# Patient Record
Sex: MALE | Race: WHITE | Employment: FULL TIME
[De-identification: names, ages, dates, MRNs, and addresses within clinical notes are randomized per-mention and may not be internally consistent; named-entity substitution may affect disease eponyms.]

---

## 2024-01-31 ENCOUNTER — OFFICE VISIT (OUTPATIENT)
Facility: CLINIC | Age: 64
End: 2024-01-31

## 2024-01-31 ENCOUNTER — HOSPITAL ENCOUNTER (OUTPATIENT)
Facility: HOSPITAL | Age: 64
Setting detail: SPECIMEN
Discharge: HOME | End: 2024-02-03
Payer: COMMERCIAL

## 2024-01-31 VITALS
TEMPERATURE: 97.6 F | BODY MASS INDEX: 27.66 KG/M2 | OXYGEN SATURATION: 95 % | WEIGHT: 204.2 LBS | SYSTOLIC BLOOD PRESSURE: 135 MMHG | DIASTOLIC BLOOD PRESSURE: 75 MMHG | HEART RATE: 70 BPM | RESPIRATION RATE: 20 BRPM | HEIGHT: 72 IN

## 2024-01-31 DIAGNOSIS — Z13.1 DIABETES MELLITUS SCREENING: ICD-10-CM

## 2024-01-31 DIAGNOSIS — I25.10 CORONARY ARTERY DISEASE INVOLVING NATIVE CORONARY ARTERY OF NATIVE HEART WITHOUT ANGINA PECTORIS: ICD-10-CM

## 2024-01-31 DIAGNOSIS — Z98.1 S/P CERVICAL SPINAL FUSION: ICD-10-CM

## 2024-01-31 DIAGNOSIS — M17.31 POST-TRAUMATIC OSTEOARTHRITIS OF RIGHT KNEE: Primary | ICD-10-CM

## 2024-01-31 LAB
EST. AVERAGE GLUCOSE BLD GHB EST-MCNC: 120 MG/DL
HBA1C MFR BLD: 5.8 % (ref 4.2–5.6)

## 2024-01-31 PROCEDURE — 36415 COLL VENOUS BLD VENIPUNCTURE: CPT

## 2024-01-31 PROCEDURE — 83036 HEMOGLOBIN GLYCOSYLATED A1C: CPT

## 2024-01-31 RX ORDER — PREDNISONE 10 MG/1
TABLET ORAL
Qty: 63 TABLET | Refills: 0 | Status: SHIPPED | OUTPATIENT
Start: 2024-01-31

## 2024-01-31 SDOH — ECONOMIC STABILITY: HOUSING INSECURITY
IN THE LAST 12 MONTHS, WAS THERE A TIME WHEN YOU DID NOT HAVE A STEADY PLACE TO SLEEP OR SLEPT IN A SHELTER (INCLUDING NOW)?: NO

## 2024-01-31 SDOH — ECONOMIC STABILITY: INCOME INSECURITY: HOW HARD IS IT FOR YOU TO PAY FOR THE VERY BASICS LIKE FOOD, HOUSING, MEDICAL CARE, AND HEATING?: NOT VERY HARD

## 2024-01-31 SDOH — ECONOMIC STABILITY: FOOD INSECURITY: WITHIN THE PAST 12 MONTHS, THE FOOD YOU BOUGHT JUST DIDN'T LAST AND YOU DIDN'T HAVE MONEY TO GET MORE.: NEVER TRUE

## 2024-01-31 SDOH — ECONOMIC STABILITY: FOOD INSECURITY: WITHIN THE PAST 12 MONTHS, YOU WORRIED THAT YOUR FOOD WOULD RUN OUT BEFORE YOU GOT MONEY TO BUY MORE.: NEVER TRUE

## 2024-01-31 ASSESSMENT — PATIENT HEALTH QUESTIONNAIRE - PHQ9
SUM OF ALL RESPONSES TO PHQ QUESTIONS 1-9: 0
1. LITTLE INTEREST OR PLEASURE IN DOING THINGS: 0
SUM OF ALL RESPONSES TO PHQ9 QUESTIONS 1 & 2: 0
SUM OF ALL RESPONSES TO PHQ QUESTIONS 1-9: 0
2. FEELING DOWN, DEPRESSED OR HOPELESS: 0

## 2024-01-31 NOTE — PROGRESS NOTES
Jonas Eduardo is a 64 y.o. presents today for   Chief Complaint   Patient presents with    Annual Exam       Is someone accompanying this pt? NO    Is the patient using any DME equipment during OV? NO    Depression Screenin/31/2024     2:04 PM 2022     9:30 AM   PHQ-9 Questionaire   Little interest or pleasure in doing things 0 0   Feeling down, depressed, or hopeless 0 0   PHQ-9 Total Score 0 0       Abuse Screening:      10/26/2023     8:00 AM   AMB Abuse Screening   Do you ever feel afraid of your partner? N   Are you in a relationship with someone who physically or mentally threatens you? N   Is it safe for you to go home? Y       Learning Assessment:  No question data found.    Fall Risk:      10/26/2023     8:09 AM   Fall Risk   2 or more falls in past year? no   Fall with injury in past year? no           Coordination of Care:   1. \"Have you been to the ER, urgent care clinic since your last visit?  Hospitalized since your last visit?\" NO     2. \"Have you seen or consulted any other health care providers outside of the [facility] since your last visit?\" [facility]    3. For patients aged 45-75: Has the patient had a colonoscopy / FIT/ Cologuard? YES    If the patient is female:    4. For patients aged 40-74: Has the patient had a mammogram within the past 2 years? N/A    5. For patients aged 21-65: Has the patient had a pap smear? N/A    Health Maintenance: reviewed and discussed and ordered per Provider.    Health Maintenance Due   Topic Date Due    COVID-19 Vaccine (1) Never done    HIV screen  Never done    DTaP/Tdap/Td vaccine (1 - Tdap) Never done    Diabetes screen  Never done    Shingles vaccine (1 of 2) Never done    Respiratory Syncytial Virus (RSV) Pregnant or age 60 yrs+ (1 - 1-dose 60+ series) Never done    Depression Screen  2023    Flu vaccine (1) 2023        Lilian Martell LPN  [facility]
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
## 2024-02-02 ENCOUNTER — TELEPHONE (OUTPATIENT)
Facility: CLINIC | Age: 64
End: 2024-02-02

## 2024-02-16 RX ORDER — LOSARTAN POTASSIUM 25 MG/1
25 TABLET ORAL 2 TIMES DAILY
Qty: 180 TABLET | Refills: 2 | Status: SHIPPED | OUTPATIENT
Start: 2024-02-16

## 2024-02-16 NOTE — TELEPHONE ENCOUNTER
PCP: David Carranza MD    Last appt:  10/26/2023   Future Appointments   Date Time Provider Department Center   2/29/2024  8:45 AM David Carranza MD [facility] BS AMB   10/31/2024  8:15 AM Kris Morales MD [facility] BS AMB       Requested Prescriptions     Pending Prescriptions Disp Refills    losartan (COZAAR) 25 MG tablet 180 tablet 2     Sig: Take 1 tablet by mouth 2 times daily       Request for a 30 or 90 day supply? Provider Discretion    Pharmacy: confirmed    Other Comments: n/a

## 2024-02-29 ENCOUNTER — OFFICE VISIT (OUTPATIENT)
Facility: CLINIC | Age: 64
End: 2024-02-29
Payer: COMMERCIAL

## 2024-02-29 VITALS
HEIGHT: 72 IN | TEMPERATURE: 97 F | WEIGHT: 204.6 LBS | DIASTOLIC BLOOD PRESSURE: 83 MMHG | BODY MASS INDEX: 27.71 KG/M2 | SYSTOLIC BLOOD PRESSURE: 133 MMHG | RESPIRATION RATE: 20 BRPM | HEART RATE: 65 BPM | OXYGEN SATURATION: 96 %

## 2024-02-29 DIAGNOSIS — I25.10 CORONARY ARTERY DISEASE INVOLVING NATIVE CORONARY ARTERY OF NATIVE HEART WITHOUT ANGINA PECTORIS: ICD-10-CM

## 2024-02-29 DIAGNOSIS — M17.31 POST-TRAUMATIC OSTEOARTHRITIS OF RIGHT KNEE: ICD-10-CM

## 2024-02-29 DIAGNOSIS — Z98.1 S/P CERVICAL SPINAL FUSION: ICD-10-CM

## 2024-02-29 DIAGNOSIS — R73.01 IMPAIRED FASTING GLUCOSE: ICD-10-CM

## 2024-02-29 DIAGNOSIS — J06.9 URI WITH COUGH AND CONGESTION: Primary | ICD-10-CM

## 2024-02-29 PROCEDURE — 3075F SYST BP GE 130 - 139MM HG: CPT | Performed by: FAMILY MEDICINE

## 2024-02-29 PROCEDURE — 99213 OFFICE O/P EST LOW 20 MIN: CPT | Performed by: FAMILY MEDICINE

## 2024-02-29 PROCEDURE — 3079F DIAST BP 80-89 MM HG: CPT | Performed by: FAMILY MEDICINE

## 2024-02-29 RX ORDER — PREDNISONE 10 MG/1
TABLET ORAL
Qty: 60 TABLET | Refills: 0 | Status: SHIPPED | OUTPATIENT
Start: 2024-02-29

## 2024-02-29 NOTE — PROGRESS NOTES
Jonas Eduardo is a 64 y.o.  male and presents with 1 mo follow up on joint pain.        Subjective:  Patient reports good compliance with prednisone. Has noticed significant improvement. He has an ortho apt next week for the neck. He was unable to schedule a rheum apt because did not have their number.            Additional Concerns: 4 day history of URI sxs. He noticed a \"scratchy\" cough on Mon. Now he has head fullness. He has controlled the sxs with Mucinex and Tylenol with good relief. Denies shortness of breath, chest pain, N/V, and fevers.             ROS   General ROS: negative for - chills or fever  ENT ROS: positive for - headaches, nasal congestion, sinus pain, and sore throat  Respiratory ROS: positive for - cough negative for - shortness of breath  Cardiovascular ROS: no chest pain or dyspnea on exertion  Musculoskeletal ROS: positive for - joint pain  Neurological ROS: positive for - headaches    All other systems reviewed and are negative.      Objective:    alert, well appearing, and in no distress, oriented to person, place, and time, and overweight  Mental status - normal mood, behavior, speech, dress, motor activity, and thought processes  Chest - clear to auscultation, no wheezes, rales or rhonchi, symmetric air entry  Heart - normal rate, regular rhythm, normal S1, S2, no murmurs, rubs, clicks or gallops  Right knee - Arthritic changes noted  Neck - Hypertonic; no tenderness to palpation.      Assessment/Plan:    1. Post-traumatic osteoarthritis of right knee  Refer for further evaluation and prednisone until seen  - External Referral To Rheumatology (given number to call)  - predniSONE (DELTASONE) 10 MG tablet  2. Coronary artery disease involving native coronary artery of native heart without angina pectoris  Follow up with cardiologist    3. S/P cervical spinal fusion    - predniSONE (DELTASONE) 10 MG tablet; Take 6 tabs for 3 days then 5 tabs for 3 days then 4 tabs for 3 days then 3 tabs

## 2024-02-29 NOTE — PROGRESS NOTES
Jonas Eduardo is a 64 y.o. presents today for   Chief Complaint   Patient presents with    Cough    Congestion    Headache       Is someone accompanying this pt? NO    Is the patient using any DME equipment during OV? NO    Depression Screenin/31/2024     2:04 PM 2022     9:30 AM   PHQ-9 Questionaire   Little interest or pleasure in doing things 0 0   Feeling down, depressed, or hopeless 0 0   PHQ-9 Total Score 0 0       Abuse Screening:      10/26/2023     8:00 AM   AMB Abuse Screening   Do you ever feel afraid of your partner? N   Are you in a relationship with someone who physically or mentally threatens you? N   Is it safe for you to go home? Y       Learning Assessment:  No question data found.    Fall Risk:      10/26/2023     8:09 AM   Fall Risk   2 or more falls in past year? no   Fall with injury in past year? no           Coordination of Care:   1. \"Have you been to the ER, urgent care clinic since your last visit?  Hospitalized since your last visit?\" NO    2. \"Have you seen or consulted any other health care providers outside of the [facility] since your last visit?\" NO    3. For patients aged 45-75: Has the patient had a colonoscopy / FIT/ Cologuard? YES    If the patient is female:    4. For patients aged 40-74: Has the patient had a mammogram within the past 2 years? N/A    5. For patients aged 21-65: Has the patient had a pap smear? N/A    Health Maintenance: reviewed and discussed and ordered per Provider.    Health Maintenance Due   Topic Date Due    COVID-19 Vaccine (1) Never done    HIV screen  Never done    DTaP/Tdap/Td vaccine (1 - Tdap) Never done    Shingles vaccine (1 of 2) Never done    Respiratory Syncytial Virus (RSV) Pregnant or age 60 yrs+ (1 - 1-dose 60+ series) Never done    Flu vaccine (1) 2023        Lilian Martell LPN  [facility]  Phone: [phone]  Fax: [phone]

## 2024-05-09 RX ORDER — CARVEDILOL 6.25 MG/1
6.25 TABLET ORAL 2 TIMES DAILY WITH MEALS
Qty: 90 TABLET | Refills: 4 | Status: CANCELLED | OUTPATIENT
Start: 2024-05-09

## 2024-05-09 NOTE — TELEPHONE ENCOUNTER
PCP: David Carranza MD    Last appt:  10/26/2023   Future Appointments   Date Time Provider Department Center   8/29/2024  8:45 AM David Carranza MD [facility] BS AMB   10/31/2024  8:15 AM Kris Morales MD [facility] BS AMB       Requested Prescriptions     Pending Prescriptions Disp Refills    isosorbide mononitrate (IMDUR) 30 MG extended release tablet 90 tablet 2     Sig: Take 1 tablet by mouth daily       Request for a 30 or 90 day supply? Provider Discretion    Pharmacy: confirm    Other Comments: n/a

## 2024-05-09 NOTE — TELEPHONE ENCOUNTER
PCP: David Carranza MD    Last appt:  10/26/2023   Future Appointments   Date Time Provider Department Center   8/29/2024  8:45 AM David Carranza MD [facility] BS AMB   10/31/2024  8:15 AM Kris Morales MD [facility] BS AMB       Requested Prescriptions     Pending Prescriptions Disp Refills    isosorbide mononitrate (IMDUR) 30 MG extended release tablet 90 tablet 2     Sig: Take 1 tablet by mouth daily    carvedilol (COREG) 6.25 MG tablet 90 tablet 4     Sig: Take 1 tablet by mouth with breakfast and with evening meal       Request for a 30 or 90 day supply? Provider Discretion    Pharmacy: confirm    Other Comments: n/a

## 2024-05-10 RX ORDER — ISOSORBIDE MONONITRATE 30 MG/1
30 TABLET, EXTENDED RELEASE ORAL DAILY
Qty: 90 TABLET | Refills: 1 | Status: SHIPPED | OUTPATIENT
Start: 2024-05-10

## 2024-05-10 RX ORDER — CARVEDILOL 6.25 MG/1
6.25 TABLET ORAL 2 TIMES DAILY WITH MEALS
Qty: 90 TABLET | Refills: 1 | Status: SHIPPED | OUTPATIENT
Start: 2024-05-10

## 2024-05-16 RX ORDER — ISOSORBIDE MONONITRATE 30 MG/1
30 TABLET, EXTENDED RELEASE ORAL DAILY
Qty: 90 TABLET | Refills: 3 | Status: SHIPPED | OUTPATIENT
Start: 2024-05-16

## 2024-05-16 NOTE — TELEPHONE ENCOUNTER
PCP: David Carranza MD    Last appt:  10/26/2023   Future Appointments   Date Time Provider Department Center   8/29/2024  8:45 AM David Carranza MD [facility] BS AMB   10/31/2024  8:15 AM Kris Morales MD [facility] BS AMB       Requested Prescriptions     Pending Prescriptions Disp Refills    isosorbide mononitrate (IMDUR) 30 MG extended release tablet 90 tablet 1     Sig: Take 1 tablet by mouth daily       Request for a 30 or 90 day supply? Provider Discretion    Pharmacy: confirm    Other Comments: n/a

## 2024-06-24 DIAGNOSIS — Z98.1 S/P CERVICAL SPINAL FUSION: ICD-10-CM

## 2024-06-24 DIAGNOSIS — M17.31 POST-TRAUMATIC OSTEOARTHRITIS OF RIGHT KNEE: ICD-10-CM

## 2024-06-26 RX ORDER — PREDNISONE 10 MG/1
TABLET ORAL
Qty: 60 TABLET | Refills: 0 | Status: SHIPPED | OUTPATIENT
Start: 2024-06-26

## 2024-07-09 RX ORDER — ATORVASTATIN CALCIUM 40 MG/1
40 TABLET, FILM COATED ORAL DAILY
Qty: 90 TABLET | Refills: 2 | Status: SHIPPED | OUTPATIENT
Start: 2024-07-09

## 2024-07-09 NOTE — TELEPHONE ENCOUNTER
PCP: David Carranza MD    Last appt:  10/26/2023   Future Appointments   Date Time Provider Department Center   8/29/2024  8:45 AM David Carranza MD [facility] BS AMB   10/31/2024  8:15 AM Kris Morales MD [facility] BS AMB       Requested Prescriptions     Pending Prescriptions Disp Refills    atorvastatin (LIPITOR) 40 MG tablet 90 tablet 2     Sig: Take 1 tablet by mouth daily       Request for a 30 or 90 day supply? Provider Discretion    Pharmacy: confirmed    Other Comments:n/a

## 2024-08-15 NOTE — TELEPHONE ENCOUNTER
PCP: David Carranza MD    Last appt:  10/26/2023   Future Appointments   Date Time Provider Department Center   8/29/2024  8:45 AM David Carranza MD [facility]   10/31/2024  8:15 AM rKis Morales MD [facility]       Requested Prescriptions     Pending Prescriptions Disp Refills    carvedilol (COREG) 6.25 MG tablet 90 tablet 1     Sig: Take 1 tablet by mouth with breakfast and with evening meal       Request for a 30 or 90 day supply? Provider Discretion    Pharmacy: confirmed     Other Comments: n/a

## 2024-08-16 RX ORDER — CARVEDILOL 6.25 MG/1
6.25 TABLET ORAL 2 TIMES DAILY WITH MEALS
Qty: 90 TABLET | Refills: 1 | Status: SHIPPED | OUTPATIENT
Start: 2024-08-16

## 2024-08-29 ENCOUNTER — OFFICE VISIT (OUTPATIENT)
Facility: CLINIC | Age: 64
End: 2024-08-29
Payer: COMMERCIAL

## 2024-08-29 ENCOUNTER — HOSPITAL ENCOUNTER (OUTPATIENT)
Facility: HOSPITAL | Age: 64
Setting detail: SPECIMEN
Discharge: HOME | End: 2024-08-29
Payer: COMMERCIAL

## 2024-08-29 VITALS
HEIGHT: 72 IN | SYSTOLIC BLOOD PRESSURE: 117 MMHG | DIASTOLIC BLOOD PRESSURE: 73 MMHG | HEART RATE: 54 BPM | TEMPERATURE: 97.6 F | BODY MASS INDEX: 27.66 KG/M2 | RESPIRATION RATE: 18 BRPM | WEIGHT: 204.2 LBS | OXYGEN SATURATION: 98 %

## 2024-08-29 DIAGNOSIS — M79.2 RADICULAR PAIN IN RIGHT ARM: Primary | ICD-10-CM

## 2024-08-29 DIAGNOSIS — M17.31 POST-TRAUMATIC OSTEOARTHRITIS OF RIGHT KNEE: ICD-10-CM

## 2024-08-29 DIAGNOSIS — I25.10 CORONARY ARTERY DISEASE INVOLVING NATIVE CORONARY ARTERY OF NATIVE HEART WITHOUT ANGINA PECTORIS: ICD-10-CM

## 2024-08-29 DIAGNOSIS — R73.01 IMPAIRED FASTING GLUCOSE: ICD-10-CM

## 2024-08-29 DIAGNOSIS — E78.00 HYPERCHOLESTEREMIA: ICD-10-CM

## 2024-08-29 DIAGNOSIS — Z98.1 S/P CERVICAL SPINAL FUSION: ICD-10-CM

## 2024-08-29 DIAGNOSIS — Z28.21 REFUSED INFLUENZA VACCINE: ICD-10-CM

## 2024-08-29 DIAGNOSIS — I10 PRIMARY HYPERTENSION: ICD-10-CM

## 2024-08-29 LAB
ALBUMIN SERPL-MCNC: 3.3 G/DL (ref 3.4–5)
ALBUMIN/GLOB SERPL: 0.8 (ref 0.8–1.7)
ALP SERPL-CCNC: 191 U/L (ref 45–117)
ALT SERPL-CCNC: 42 U/L (ref 16–61)
ANION GAP SERPL CALC-SCNC: 8 MMOL/L (ref 3–18)
AST SERPL-CCNC: 26 U/L (ref 10–38)
BILIRUB SERPL-MCNC: 0.6 MG/DL (ref 0.2–1)
BUN SERPL-MCNC: 11 MG/DL (ref 7–18)
BUN/CREAT SERPL: 15 (ref 12–20)
CALCIUM SERPL-MCNC: 9.5 MG/DL (ref 8.5–10.1)
CHLORIDE SERPL-SCNC: 106 MMOL/L (ref 100–111)
CHOLEST SERPL-MCNC: 137 MG/DL
CO2 SERPL-SCNC: 26 MMOL/L (ref 21–32)
CREAT SERPL-MCNC: 0.74 MG/DL (ref 0.6–1.3)
EST. AVERAGE GLUCOSE BLD GHB EST-MCNC: 128 MG/DL
GLOBULIN SER CALC-MCNC: 4.1 G/DL (ref 2–4)
GLUCOSE SERPL-MCNC: 125 MG/DL (ref 74–99)
HBA1C MFR BLD: 6.1 % (ref 4.2–5.6)
HDLC SERPL-MCNC: 47 MG/DL (ref 40–60)
HDLC SERPL: 2.9 (ref 0–5)
LDLC SERPL CALC-MCNC: 73.4 MG/DL (ref 0–100)
LIPID PANEL: NORMAL
POTASSIUM SERPL-SCNC: 4.3 MMOL/L (ref 3.5–5.5)
PROT SERPL-MCNC: 7.4 G/DL (ref 6.4–8.2)
SODIUM SERPL-SCNC: 140 MMOL/L (ref 136–145)
TRIGL SERPL-MCNC: 83 MG/DL
VLDLC SERPL CALC-MCNC: 16.6 MG/DL

## 2024-08-29 PROCEDURE — 83036 HEMOGLOBIN GLYCOSYLATED A1C: CPT

## 2024-08-29 PROCEDURE — 80061 LIPID PANEL: CPT

## 2024-08-29 PROCEDURE — 99214 OFFICE O/P EST MOD 30 MIN: CPT | Performed by: FAMILY MEDICINE

## 2024-08-29 PROCEDURE — 3074F SYST BP LT 130 MM HG: CPT | Performed by: FAMILY MEDICINE

## 2024-08-29 PROCEDURE — 80053 COMPREHEN METABOLIC PANEL: CPT

## 2024-08-29 PROCEDURE — 3078F DIAST BP <80 MM HG: CPT | Performed by: FAMILY MEDICINE

## 2024-08-29 PROCEDURE — 36415 COLL VENOUS BLD VENIPUNCTURE: CPT

## 2024-08-29 RX ORDER — PREDNISONE 10 MG/1
TABLET ORAL
Qty: 60 TABLET | Refills: 0 | Status: SHIPPED | OUTPATIENT
Start: 2024-08-29

## 2024-08-29 RX ORDER — GABAPENTIN 300 MG/1
300 CAPSULE ORAL 3 TIMES DAILY
COMMUNITY
Start: 2024-08-16

## 2024-08-29 SDOH — ECONOMIC STABILITY: FOOD INSECURITY: WITHIN THE PAST 12 MONTHS, THE FOOD YOU BOUGHT JUST DIDN'T LAST AND YOU DIDN'T HAVE MONEY TO GET MORE.: NEVER TRUE

## 2024-08-29 SDOH — ECONOMIC STABILITY: FOOD INSECURITY: WITHIN THE PAST 12 MONTHS, YOU WORRIED THAT YOUR FOOD WOULD RUN OUT BEFORE YOU GOT MONEY TO BUY MORE.: NEVER TRUE

## 2024-08-29 SDOH — ECONOMIC STABILITY: INCOME INSECURITY: HOW HARD IS IT FOR YOU TO PAY FOR THE VERY BASICS LIKE FOOD, HOUSING, MEDICAL CARE, AND HEATING?: NOT VERY HARD

## 2024-08-29 ASSESSMENT — PATIENT HEALTH QUESTIONNAIRE - PHQ9
SUM OF ALL RESPONSES TO PHQ QUESTIONS 1-9: 0
SUM OF ALL RESPONSES TO PHQ QUESTIONS 1-9: 0
1. LITTLE INTEREST OR PLEASURE IN DOING THINGS: NOT AT ALL
SUM OF ALL RESPONSES TO PHQ9 QUESTIONS 1 & 2: 0
2. FEELING DOWN, DEPRESSED OR HOPELESS: NOT AT ALL
SUM OF ALL RESPONSES TO PHQ QUESTIONS 1-9: 0
SUM OF ALL RESPONSES TO PHQ QUESTIONS 1-9: 0

## 2024-08-29 NOTE — PROGRESS NOTES
Jonas Eduardo is a 64 y.o.  male and presents with    Chief Complaint   Patient presents with    Medication Refill           Subjective:  Mr. Eduardo is following up for right side pinched nerve; he has had corticosteroid in cervical spine 2 months ago for the left side with minimal relief.  He does not drop items; he has weakness in arms and shoulders; he takes prednisone intermittently with good results averaging once a weak.  He is s/p physical therapy for 5 weeks with some improvement.      He has hypertension and hypercholesterolemia with h/o CAD and NSTEMI; he is followed by cardiology for management.      ROS   General ROS: negative for - chills or fever  ENT ROS: positive for - headaches, nasal congestion, sinus pain, and sore throat  Respiratory ROS: positive for - cough negative for - shortness of breath  Cardiovascular ROS: no chest pain or dyspnea on exertion  Musculoskeletal ROS: positive for - joint pain  Neurological ROS: positive for - headaches    All other systems reviewed and are negative.      Objective:  Vitals:    08/29/24 0914   BP: 117/73   Pulse: 54   Resp: 18   Temp: 97.6 °F (36.4 °C)   SpO2: 98%       alert, well appearing, and in no distress, oriented to person, place, and time, and overweight  Mental status - normal mood, behavior, speech, dress, motor activity, and thought processes  Chest - clear to auscultation, no wheezes, rales or rhonchi, symmetric air entry  Heart - normal rate, regular rhythm, normal S1, S2, no murmurs, rubs, clicks or gallops  Right knee - Arthritic changes noted  Neck - Hypertonic; no tenderness to palpation.    LABS     TESTS      Assessment/Plan:    1. Radicular pain in right arm  Follow up with ortho for cervical ddd and BLANK    2. Post-traumatic osteoarthritis of right knee  Exercises demonstrated    3. Impaired fasting glucose  Encourage low carb diet  - Hemoglobin A1C; Future    4. Coronary artery disease involving native coronary artery of native heart

## 2024-08-29 NOTE — PROGRESS NOTES
Jonas Eduardo is a 64 y.o. presents today for No chief complaint on file.    Is someone accompanying this pt? no    Is the patient using any DME equipment during OV? no  There were no vitals filed for this visit.    Depression Screenin/29/2024     9:06 AM 2024     2:04 PM 2022     9:30 AM   PHQ-9 Questionaire   Little interest or pleasure in doing things 0 0 0   Feeling down, depressed, or hopeless 0 0 0   PHQ-9 Total Score 0 0 0        Abuse Screening:      10/26/2023     8:00 AM   AMB Abuse Screening   Do you ever feel afraid of your partner? N   Are you in a relationship with someone who physically or mentally threatens you? N   Is it safe for you to go home? Y        Learning Assessment Screening:   No question data found.     Fall Risk Screening:       10/26/2023     8:09 AM   Fall Risk   Do you feel unsteady or are you worried about falling?  no   2 or more falls in past year? no   Fall with injury in past year? no           Health Maintenance: reviewed and discussed and ordered per Provider.    Health Maintenance Due   Topic Date Due    HIV screen  Never done    DTaP/Tdap/Td vaccine (1 - Tdap) Never done    Shingles vaccine (1 of 2) Never done    Respiratory Syncytial Virus (RSV) Pregnant or age 60 yrs+ (1 - 1-dose 60+ series) Never done    COVID-19 Vaccine (1 - - season) Never done    Flu vaccine (1) 2024         Coordination of Care:   1. \"Have you been to the ER, urgent care clinic since your last visit?  Hospitalized since your last visit?\" no    2. \"Have you seen or consulted any other health care providers outside of the [facility] since your last visit?\" no    3. For patients aged 45-75: Has the patient had a colonoscopy / FIT/ Cologuard?Yes - no Care Gap present  If the patient is female:    4. For patients aged 40-74: Has the patient had a mammogram within the past 2 years? NA - based on age or sex    5. For patients aged 21-65: Has the patient had a pap  smear? NA - based on age or sex    Advanced Directive:  1. Do you have an Advanced Directive? No     2. Would you like information on Advanced Directives? No

## 2024-09-12 DIAGNOSIS — R74.8 ELEVATED ALKALINE PHOSPHATASE LEVEL: Primary | ICD-10-CM

## 2024-10-08 ENCOUNTER — HOSPITAL ENCOUNTER (OUTPATIENT)
Facility: HOSPITAL | Age: 64
Setting detail: SPECIMEN
Discharge: HOME | End: 2024-10-11
Payer: COMMERCIAL

## 2024-10-08 DIAGNOSIS — R74.8 ELEVATED ALKALINE PHOSPHATASE LEVEL: ICD-10-CM

## 2024-10-08 PROCEDURE — 36415 COLL VENOUS BLD VENIPUNCTURE: CPT

## 2024-10-08 PROCEDURE — 82977 ASSAY OF GGT: CPT

## 2024-10-09 LAB — GGT SERPL-CCNC: 218 U/L (ref 15–85)

## 2024-10-16 DIAGNOSIS — R74.8 ELEVATED SERUM GGT LEVEL: Primary | ICD-10-CM

## 2024-10-31 ENCOUNTER — OFFICE VISIT (OUTPATIENT)
Age: 64
End: 2024-10-31
Payer: COMMERCIAL

## 2024-10-31 ENCOUNTER — HOSPITAL ENCOUNTER (OUTPATIENT)
Facility: HOSPITAL | Age: 64
Setting detail: SPECIMEN
Discharge: HOME | End: 2024-11-03
Payer: COMMERCIAL

## 2024-10-31 VITALS
SYSTOLIC BLOOD PRESSURE: 126 MMHG | HEART RATE: 70 BPM | HEIGHT: 72 IN | OXYGEN SATURATION: 95 % | DIASTOLIC BLOOD PRESSURE: 62 MMHG | BODY MASS INDEX: 27.5 KG/M2 | WEIGHT: 203 LBS

## 2024-10-31 DIAGNOSIS — R07.9 CHEST PAIN WITH HIGH RISK FOR CARDIAC ETIOLOGY: ICD-10-CM

## 2024-10-31 DIAGNOSIS — M25.561 ARTHRALGIA OF BOTH KNEES: Primary | ICD-10-CM

## 2024-10-31 DIAGNOSIS — I10 ESSENTIAL (PRIMARY) HYPERTENSION: ICD-10-CM

## 2024-10-31 DIAGNOSIS — R06.00 DYSPNEA, UNSPECIFIED TYPE: ICD-10-CM

## 2024-10-31 DIAGNOSIS — M25.562 ARTHRALGIA OF BOTH KNEES: Primary | ICD-10-CM

## 2024-10-31 DIAGNOSIS — I10 ESSENTIAL (PRIMARY) HYPERTENSION: Primary | ICD-10-CM

## 2024-10-31 LAB — CK SERPL-CCNC: 79 U/L (ref 39–308)

## 2024-10-31 PROCEDURE — 82550 ASSAY OF CK (CPK): CPT

## 2024-10-31 PROCEDURE — 3078F DIAST BP <80 MM HG: CPT | Performed by: INTERNAL MEDICINE

## 2024-10-31 PROCEDURE — 3074F SYST BP LT 130 MM HG: CPT | Performed by: INTERNAL MEDICINE

## 2024-10-31 PROCEDURE — 36415 COLL VENOUS BLD VENIPUNCTURE: CPT

## 2024-10-31 PROCEDURE — 99214 OFFICE O/P EST MOD 30 MIN: CPT | Performed by: INTERNAL MEDICINE

## 2024-10-31 NOTE — PATIENT INSTRUCTIONS
New Medication/Medication Changes/Medication Refill  Stop atorvastatin x 1 month     **please allow 24-48 hrs for medication to be escribed to pharmacy** If you need any refills on medications please contact your pharmacy so that the request can be escribed to the provider for review seven to 10 days prior to being out of medication.    Labs  CK level     *[facility], [address] [place] [address]  ( M - Thur 8am to 3:30pm.) - You must call to make an appointment for blood work at this site.   Contact :[phone]  *[facility] [address] [facility]  *[facility], [address] [facility]  *[facility], [address] [facility]  *[facility], [address] [facility]

## 2024-10-31 NOTE — PROGRESS NOTES
[facility]    Jonas Eduardo is 64 y.o. male     Denies any resting or exertional shortness of breath to suggest congestive heart failure  Today patient complains of multiple joint pain, muscle ache.  He also has been having some knee pain and also some neck pain.  Taking prednisone on and off with some relief.  No resting or exertional chest pain or chest tightness.  No palpitation, presyncope or syncope.  No significant shortness of breath  Denies any nausea, vomiting, abdominal pain, fever, chills, sputum production. No hematuria or other bleeding complaints    Past Medical History:   Diagnosis Date    CAD (coronary artery disease)     02/14 -  S/P RCA BMS (3.5 X 18 mm Vision)    Calculus of kidney     Herniated disc, cervical 2009    c5 and c6    HLD (hyperlipidemia)     Hypertension     Noncompliance     NSTEMI (non-ST elevated myocardial infarction) (HCC) 02/2014       Review of Systems:  Cardiac symptoms as noted above in HPI. All others negative.    Current Outpatient Medications   Medication Sig    gabapentin (NEURONTIN) 300 MG capsule Take 1 capsule by mouth 3 times daily.    predniSONE (DELTASONE) 10 MG tablet Take 1 tab daily    carvedilol (COREG) 6.25 MG tablet Take 1 tablet by mouth with breakfast and with evening meal    atorvastatin (LIPITOR) 40 MG tablet Take 1 tablet by mouth daily    isosorbide mononitrate (IMDUR) 30 MG extended release tablet Take 1 tablet by mouth daily    losartan (COZAAR) 25 MG tablet Take 1 tablet by mouth 2 times daily    aspirin 81 MG EC tablet Take 1 tablet by mouth daily    nitroGLYCERIN (NITROSTAT) 0.4 MG SL tablet Place 1 tablet under the tongue every 5 minutes as needed for Chest pain     No current facility-administered medications for this visit.       Past Surgical History:   Procedure Laterality Date    BACK SURGERY      c5/6 plate    COLONOSCOPY N/A 4/10/2019    COLONOSCOPY performed by Anupama Osorio

## 2024-11-01 ENCOUNTER — TELEPHONE (OUTPATIENT)
Age: 64
End: 2024-11-01

## 2024-11-01 NOTE — TELEPHONE ENCOUNTER
----- Message from Dr. Kris Morales MD sent at 11/1/2024 10:51 AM EDT -----  Please inform patient regarding test finding  Appears normal. CK level    Thanks  SP

## 2024-11-15 DIAGNOSIS — M05.79 RHEUMATOID ARTHRITIS INVOLVING MULTIPLE SITES WITH POSITIVE RHEUMATOID FACTOR (HCC): ICD-10-CM

## 2024-11-15 DIAGNOSIS — R74.8 ALKALINE PHOSPHATASE ELEVATION: Primary | ICD-10-CM

## 2024-11-15 DIAGNOSIS — M79.2 RADICULAR PAIN IN RIGHT ARM: Primary | ICD-10-CM

## 2024-11-18 RX ORDER — CARVEDILOL 6.25 MG/1
6.25 TABLET ORAL 2 TIMES DAILY WITH MEALS
Qty: 180 TABLET | Refills: 2 | OUTPATIENT
Start: 2024-11-18

## 2024-11-18 RX ORDER — CARVEDILOL 6.25 MG/1
6.25 TABLET ORAL 2 TIMES DAILY WITH MEALS
Qty: 60 TABLET | Refills: 6 | Status: SHIPPED | OUTPATIENT
Start: 2024-11-18

## 2024-11-18 NOTE — TELEPHONE ENCOUNTER
PCP: David Carranza MD    Last appt:  10/31/2024   Future Appointments   Date Time Provider Department Center   1/29/2025  8:45 AM David Carranza MD [facility]   7/31/2025  8:00 AM Irina Stanley PA-C [facility] BS AMB       Requested Prescriptions     Pending Prescriptions Disp Refills    carvedilol (COREG) 6.25 MG tablet 90 tablet 1     Sig: Take 1 tablet by mouth with breakfast and with evening meal       Request for a 30 or 90 day supply? Provider Discretion    Pharmacy: confirmed     Other Comments:n/a

## 2024-11-18 NOTE — TELEPHONE ENCOUNTER
PCP: David Carranza MD    Last appt:  10/31/2024   Future Appointments   Date Time Provider Department Center   1/29/2025  8:45 AM David Carranza MD [facility]   7/31/2025  8:00 AM Irina Stanley PA-C [facility] BS AMB       Requested Prescriptions     Pending Prescriptions Disp Refills    carvedilol (COREG) 6.25 MG tablet [Pharmacy Med Name: Carvedilol 6.25 MG Oral Tablet] 90 tablet 0     Sig: TAKE 1 TABLET BY MOUTH WITH BREAKFAST AND WITH EVENING MEAL       Request for a 30 or 90 day supply? Provider Discretion    Pharmacy: confirmed     Other Comments:n/a

## 2024-11-27 RX ORDER — LOSARTAN POTASSIUM 25 MG/1
25 TABLET ORAL 2 TIMES DAILY
Qty: 180 TABLET | Refills: 2 | Status: SHIPPED | OUTPATIENT
Start: 2024-11-27

## 2024-11-27 NOTE — TELEPHONE ENCOUNTER
PCP: David Carranza MD    Last appt:  10/31/2024   Future Appointments   Date Time Provider Department Center   1/29/2025  8:45 AM David Carranza MD [facility]   7/31/2025  8:00 AM Irina Stanley PA-C [facility] BS AMB       Requested Prescriptions     Pending Prescriptions Disp Refills    losartan (COZAAR) 25 MG tablet 180 tablet 2     Sig: Take 1 tablet by mouth 2 times daily       Request for a 30 or 90 day supply? Provider Discretion    Pharmacy: confirmed    Other Comments: n/a

## 2025-01-26 SDOH — ECONOMIC STABILITY: FOOD INSECURITY: WITHIN THE PAST 12 MONTHS, THE FOOD YOU BOUGHT JUST DIDN'T LAST AND YOU DIDN'T HAVE MONEY TO GET MORE.: PATIENT DECLINED

## 2025-01-26 SDOH — ECONOMIC STABILITY: INCOME INSECURITY: IN THE LAST 12 MONTHS, WAS THERE A TIME WHEN YOU WERE NOT ABLE TO PAY THE MORTGAGE OR RENT ON TIME?: PATIENT DECLINED

## 2025-01-26 SDOH — ECONOMIC STABILITY: FOOD INSECURITY: WITHIN THE PAST 12 MONTHS, YOU WORRIED THAT YOUR FOOD WOULD RUN OUT BEFORE YOU GOT MONEY TO BUY MORE.: PATIENT DECLINED

## 2025-01-26 SDOH — ECONOMIC STABILITY: TRANSPORTATION INSECURITY
IN THE PAST 12 MONTHS, HAS LACK OF TRANSPORTATION KEPT YOU FROM MEETINGS, WORK, OR FROM GETTING THINGS NEEDED FOR DAILY LIVING?: PATIENT DECLINED

## 2025-01-26 SDOH — ECONOMIC STABILITY: TRANSPORTATION INSECURITY
IN THE PAST 12 MONTHS, HAS THE LACK OF TRANSPORTATION KEPT YOU FROM MEDICAL APPOINTMENTS OR FROM GETTING MEDICATIONS?: PATIENT DECLINED

## 2025-01-27 ENCOUNTER — HOSPITAL ENCOUNTER (OUTPATIENT)
Facility: HOSPITAL | Age: 65
Setting detail: SPECIMEN
Discharge: HOME | End: 2025-01-30
Payer: COMMERCIAL

## 2025-01-27 PROCEDURE — 86381 MITOCHONDRIAL ANTIBODY EACH: CPT

## 2025-01-27 PROCEDURE — 36415 COLL VENOUS BLD VENIPUNCTURE: CPT

## 2025-01-29 ENCOUNTER — OFFICE VISIT (OUTPATIENT)
Facility: CLINIC | Age: 65
End: 2025-01-29
Payer: COMMERCIAL

## 2025-01-29 VITALS
WEIGHT: 208 LBS | SYSTOLIC BLOOD PRESSURE: 128 MMHG | TEMPERATURE: 97.3 F | OXYGEN SATURATION: 96 % | HEART RATE: 65 BPM | RESPIRATION RATE: 17 BRPM | DIASTOLIC BLOOD PRESSURE: 77 MMHG | BODY MASS INDEX: 28.17 KG/M2 | HEIGHT: 72 IN

## 2025-01-29 DIAGNOSIS — R73.01 IMPAIRED FASTING GLUCOSE: ICD-10-CM

## 2025-01-29 DIAGNOSIS — Z00.00 ANNUAL PHYSICAL EXAM: Primary | ICD-10-CM

## 2025-01-29 DIAGNOSIS — R76.8 RHEUMATOID FACTOR POSITIVE: ICD-10-CM

## 2025-01-29 DIAGNOSIS — I25.10 CORONARY ARTERY DISEASE INVOLVING NATIVE CORONARY ARTERY OF NATIVE HEART WITHOUT ANGINA PECTORIS: ICD-10-CM

## 2025-01-29 DIAGNOSIS — Z12.5 SCREENING FOR MALIGNANT NEOPLASM OF PROSTATE: ICD-10-CM

## 2025-01-29 PROCEDURE — 3074F SYST BP LT 130 MM HG: CPT | Performed by: FAMILY MEDICINE

## 2025-01-29 PROCEDURE — 99396 PREV VISIT EST AGE 40-64: CPT | Performed by: FAMILY MEDICINE

## 2025-01-29 PROCEDURE — 3078F DIAST BP <80 MM HG: CPT | Performed by: FAMILY MEDICINE

## 2025-01-29 ASSESSMENT — PATIENT HEALTH QUESTIONNAIRE - PHQ9
2. FEELING DOWN, DEPRESSED OR HOPELESS: NOT AT ALL
SUM OF ALL RESPONSES TO PHQ QUESTIONS 1-9: 0
1. LITTLE INTEREST OR PLEASURE IN DOING THINGS: NOT AT ALL
SUM OF ALL RESPONSES TO PHQ9 QUESTIONS 1 & 2: 0
SUM OF ALL RESPONSES TO PHQ QUESTIONS 1-9: 0

## 2025-01-29 ASSESSMENT — ANXIETY QUESTIONNAIRES
4. TROUBLE RELAXING: NOT AT ALL
3. WORRYING TOO MUCH ABOUT DIFFERENT THINGS: NOT AT ALL
5. BEING SO RESTLESS THAT IT IS HARD TO SIT STILL: NOT AT ALL
6. BECOMING EASILY ANNOYED OR IRRITABLE: NOT AT ALL
7. FEELING AFRAID AS IF SOMETHING AWFUL MIGHT HAPPEN: NOT AT ALL
GAD7 TOTAL SCORE: 0
IF YOU CHECKED OFF ANY PROBLEMS ON THIS QUESTIONNAIRE, HOW DIFFICULT HAVE THESE PROBLEMS MADE IT FOR YOU TO DO YOUR WORK, TAKE CARE OF THINGS AT HOME, OR GET ALONG WITH OTHER PEOPLE: NOT DIFFICULT AT ALL
2. NOT BEING ABLE TO STOP OR CONTROL WORRYING: NOT AT ALL
1. FEELING NERVOUS, ANXIOUS, OR ON EDGE: NOT AT ALL

## 2025-01-29 NOTE — PROGRESS NOTES
Dayron Eduardo is a 65 y.o.  male and presents with    Chief Complaint   Patient presents with    Annual Exam    Medication Refill    Neck Pain           Subjective:  Well Adult Physical   Patient here for a comprehensive physical exam.The patient reports problems - shoulder pain, elbow pain and hip pain; he stopped statin 2 months ago; he has noted some relief; he has neck pain; he was seen by the chiropractor and had dry needling; he has used voltaren gel  He is taking gabapentin.  He had cortisone injection in cervical spine  14 months he has difficulty getting out of bed  Do you take any herbs or supplements that were not prescribed by a doctor? yes, B6, B12/folate, magnesium.  Are you taking calcium supplements? no Are you taking aspirin daily? yes      ROS   General ROS: negative for - chills or fever  ENT ROS: negative for - headaches, nasal congestion, sinus pain, and sore throat  Respiratory ROS: negative for - shortness of breath or cough   Cardiovascular ROS: no chest pain or dyspnea on exertion  Musculoskeletal ROS: positive for - joint pain  Neurological ROS: positive for - headaches     All other systems reviewed and are negative.    Objective:    /77 (Site: Left Upper Arm, Position: Sitting, Cuff Size: Medium Adult)   Pulse 65   Temp 97.3 °F (36.3 °C) (Temporal)   Resp 17   Ht 1.829 m (6')   Wt 94.3 kg (208 lb)   SpO2 96%   BMI 28.21 kg/m²     General Appearance:  Alert, cooperative, no distress, appears stated age   Head:  Normocephalic, without obvious abnormality, atraumatic   Eyes:  PERRL, conjunctiva/corneas clear, EOM's intact   Ears:  Normal TM's and external ear canals, both ears   Nose: Nares normal, septum midline, mucosa normal, no drainage or sinus tenderness   Throat: Lips, mucosa, and tongue normal; teeth and gums normal   Neck: Supple, symmetrical, trachea midline, no adenopathy, thyroid: not enlarged, symmetric, no tenderness/mass/nodules, no carotid bruit or

## 2025-01-29 NOTE — PROGRESS NOTES
Dayron Eduardo is a 65 y.o. year old male who presents today for   Chief Complaint   Patient presents with    Annual Exam        \"Have you been to the ER, urgent care clinic since your last visit?  Hospitalized since your last visit?\"   NO     “Have you seen or consulted any other health care providers outside our system since your last visit?”   NO             [facility]  [address] [place] [address] [id number]  Ph: [phone]  Direct Fax: [phone]

## 2025-01-30 LAB — MITOCHONDRIA M2 IGG SER-ACNC: <20 UNITS (ref 0–20)

## 2025-01-31 ENCOUNTER — PATIENT MESSAGE (OUTPATIENT)
Age: 65
End: 2025-01-31

## 2025-01-31 ENCOUNTER — HOSPITAL ENCOUNTER (OUTPATIENT)
Facility: HOSPITAL | Age: 65
Setting detail: SPECIMEN
Discharge: HOME | End: 2025-01-31
Payer: COMMERCIAL

## 2025-01-31 LAB
ALBUMIN SERPL-MCNC: 3.7 G/DL (ref 3.4–5)
ALBUMIN/GLOB SERPL: 1 (ref 0.8–1.7)
ALP SERPL-CCNC: 107 U/L (ref 45–117)
ALT SERPL-CCNC: 26 U/L (ref 16–61)
ANION GAP SERPL CALC-SCNC: 4 MMOL/L (ref 3–18)
AST SERPL-CCNC: 24 U/L (ref 10–38)
BILIRUB SERPL-MCNC: 0.7 MG/DL (ref 0.2–1)
BUN SERPL-MCNC: 13 MG/DL (ref 7–18)
BUN/CREAT SERPL: 14 (ref 12–20)
CALCIUM SERPL-MCNC: 9.2 MG/DL (ref 8.5–10.1)
CHLORIDE SERPL-SCNC: 108 MMOL/L (ref 100–111)
CHOLEST SERPL-MCNC: 203 MG/DL
CO2 SERPL-SCNC: 27 MMOL/L (ref 21–32)
CREAT SERPL-MCNC: 0.91 MG/DL (ref 0.6–1.3)
CRP SERPL HS-MCNC: >9.5 MG/L
GLOBULIN SER CALC-MCNC: 3.6 G/DL (ref 2–4)
GLUCOSE SERPL-MCNC: 117 MG/DL (ref 74–99)
HDLC SERPL-MCNC: 44 MG/DL (ref 40–60)
HDLC SERPL: 4.6 (ref 0–5)
LDLC SERPL CALC-MCNC: 128.4 MG/DL (ref 0–100)
LIPID PANEL: ABNORMAL
POTASSIUM SERPL-SCNC: 4.5 MMOL/L (ref 3.5–5.5)
PROT SERPL-MCNC: 7.3 G/DL (ref 6.4–8.2)
PSA SERPL-MCNC: 1.3 NG/ML (ref 0–4)
SODIUM SERPL-SCNC: 139 MMOL/L (ref 136–145)
TRIGL SERPL-MCNC: 153 MG/DL
VLDLC SERPL CALC-MCNC: 30.6 MG/DL

## 2025-01-31 PROCEDURE — G0103 PSA SCREENING: HCPCS

## 2025-01-31 PROCEDURE — 80061 LIPID PANEL: CPT

## 2025-01-31 PROCEDURE — 86141 C-REACTIVE PROTEIN HS: CPT

## 2025-01-31 PROCEDURE — 80053 COMPREHEN METABOLIC PANEL: CPT

## 2025-01-31 PROCEDURE — 36415 COLL VENOUS BLD VENIPUNCTURE: CPT

## 2025-02-05 RX ORDER — PITAVASTATIN CALCIUM 2.09 MG/1
2 TABLET, FILM COATED ORAL NIGHTLY
Qty: 30 TABLET | Refills: 5 | Status: SHIPPED | OUTPATIENT
Start: 2025-02-05

## 2025-02-05 NOTE — TELEPHONE ENCOUNTER
PCP: David Carranza MD    Last appt:  10/31/2024   Future Appointments   Date Time Provider Department Center   3/28/2025  8:45 AM David Carranza MD [facility]   7/31/2025  8:00 AM Irina Stanley PA-C [facility] BS AMB       Requested Prescriptions     Pending Prescriptions Disp Refills    pitavastatin (LIVALO) 2 MG TABS tablet 30 tablet 5     Sig: Take 1 tablet by mouth nightly       Request for a 30 or 90 day supply? Provider Discretion    Pharmacy: confirmed     Other Comments:  Verbal order with read back Kris Morales MD.  New start livalo 2 mg tab daily

## 2025-03-28 ENCOUNTER — HOSPITAL ENCOUNTER (OUTPATIENT)
Facility: HOSPITAL | Age: 65
Setting detail: SPECIMEN
Discharge: HOME | End: 2025-03-31
Payer: COMMERCIAL

## 2025-03-28 ENCOUNTER — OFFICE VISIT (OUTPATIENT)
Facility: CLINIC | Age: 65
End: 2025-03-28
Payer: COMMERCIAL

## 2025-03-28 VITALS
BODY MASS INDEX: 28.82 KG/M2 | WEIGHT: 212.8 LBS | TEMPERATURE: 97.7 F | RESPIRATION RATE: 20 BRPM | SYSTOLIC BLOOD PRESSURE: 118 MMHG | DIASTOLIC BLOOD PRESSURE: 72 MMHG | HEIGHT: 72 IN | OXYGEN SATURATION: 94 % | HEART RATE: 57 BPM

## 2025-03-28 DIAGNOSIS — E78.00 HYPERCHOLESTEREMIA: ICD-10-CM

## 2025-03-28 DIAGNOSIS — I25.10 CORONARY ARTERY DISEASE INVOLVING NATIVE CORONARY ARTERY OF NATIVE HEART WITHOUT ANGINA PECTORIS: ICD-10-CM

## 2025-03-28 DIAGNOSIS — M19.042 ARTHRITIS OF BOTH HANDS: Primary | ICD-10-CM

## 2025-03-28 DIAGNOSIS — M19.041 ARTHRITIS OF BOTH HANDS: Primary | ICD-10-CM

## 2025-03-28 DIAGNOSIS — M79.2 RADICULAR PAIN IN RIGHT ARM: ICD-10-CM

## 2025-03-28 DIAGNOSIS — Z98.1 S/P CERVICAL SPINAL FUSION: ICD-10-CM

## 2025-03-28 LAB
ALBUMIN SERPL-MCNC: 3.4 G/DL (ref 3.4–5)
ALBUMIN/GLOB SERPL: 1.1 (ref 0.8–1.7)
ALP SERPL-CCNC: 99 U/L (ref 45–117)
ALT SERPL-CCNC: 22 U/L (ref 16–61)
ANION GAP SERPL CALC-SCNC: 7 MMOL/L (ref 3–18)
AST SERPL-CCNC: 18 U/L (ref 10–38)
BILIRUB SERPL-MCNC: 0.5 MG/DL (ref 0.2–1)
BUN SERPL-MCNC: 10 MG/DL (ref 7–18)
BUN/CREAT SERPL: 12 (ref 12–20)
CALCIUM SERPL-MCNC: 8.4 MG/DL (ref 8.5–10.1)
CHLORIDE SERPL-SCNC: 105 MMOL/L (ref 100–111)
CHOLEST SERPL-MCNC: 125 MG/DL
CO2 SERPL-SCNC: 26 MMOL/L (ref 21–32)
CREAT SERPL-MCNC: 0.83 MG/DL (ref 0.6–1.3)
GLOBULIN SER CALC-MCNC: 3.2 G/DL (ref 2–4)
GLUCOSE SERPL-MCNC: 112 MG/DL (ref 74–99)
HDLC SERPL-MCNC: 40 MG/DL (ref 40–60)
HDLC SERPL: 3.1 (ref 0–5)
LDLC SERPL CALC-MCNC: 63.2 MG/DL (ref 0–100)
LIPID PANEL: NORMAL
POTASSIUM SERPL-SCNC: 4.5 MMOL/L (ref 3.5–5.5)
PROT SERPL-MCNC: 6.6 G/DL (ref 6.4–8.2)
SODIUM SERPL-SCNC: 138 MMOL/L (ref 136–145)
TRIGL SERPL-MCNC: 109 MG/DL
VLDLC SERPL CALC-MCNC: 21.8 MG/DL

## 2025-03-28 PROCEDURE — 80061 LIPID PANEL: CPT

## 2025-03-28 PROCEDURE — 3017F COLORECTAL CA SCREEN DOC REV: CPT | Performed by: FAMILY MEDICINE

## 2025-03-28 PROCEDURE — 99214 OFFICE O/P EST MOD 30 MIN: CPT | Performed by: FAMILY MEDICINE

## 2025-03-28 PROCEDURE — 3078F DIAST BP <80 MM HG: CPT | Performed by: FAMILY MEDICINE

## 2025-03-28 PROCEDURE — 80053 COMPREHEN METABOLIC PANEL: CPT

## 2025-03-28 PROCEDURE — G8427 DOCREV CUR MEDS BY ELIG CLIN: HCPCS | Performed by: FAMILY MEDICINE

## 2025-03-28 PROCEDURE — 36415 COLL VENOUS BLD VENIPUNCTURE: CPT

## 2025-03-28 PROCEDURE — 1036F TOBACCO NON-USER: CPT | Performed by: FAMILY MEDICINE

## 2025-03-28 PROCEDURE — 3074F SYST BP LT 130 MM HG: CPT | Performed by: FAMILY MEDICINE

## 2025-03-28 PROCEDURE — G8419 CALC BMI OUT NRM PARAM NOF/U: HCPCS | Performed by: FAMILY MEDICINE

## 2025-03-28 RX ORDER — NAPROXEN 500 MG/1
500 TABLET ORAL 2 TIMES DAILY WITH MEALS
COMMUNITY

## 2025-03-28 RX ORDER — GABAPENTIN 300 MG/1
300 CAPSULE ORAL 3 TIMES DAILY
Qty: 90 CAPSULE | Refills: 5 | Status: SHIPPED | OUTPATIENT
Start: 2025-03-28 | End: 2025-09-24

## 2025-03-28 SDOH — ECONOMIC STABILITY: FOOD INSECURITY: WITHIN THE PAST 12 MONTHS, YOU WORRIED THAT YOUR FOOD WOULD RUN OUT BEFORE YOU GOT MONEY TO BUY MORE.: NEVER TRUE

## 2025-03-28 SDOH — ECONOMIC STABILITY: FOOD INSECURITY: WITHIN THE PAST 12 MONTHS, THE FOOD YOU BOUGHT JUST DIDN'T LAST AND YOU DIDN'T HAVE MONEY TO GET MORE.: NEVER TRUE

## 2025-03-28 ASSESSMENT — PATIENT HEALTH QUESTIONNAIRE - PHQ9
1. LITTLE INTEREST OR PLEASURE IN DOING THINGS: NOT AT ALL
SUM OF ALL RESPONSES TO PHQ QUESTIONS 1-9: 0
SUM OF ALL RESPONSES TO PHQ QUESTIONS 1-9: 0
2. FEELING DOWN, DEPRESSED OR HOPELESS: NOT AT ALL
SUM OF ALL RESPONSES TO PHQ QUESTIONS 1-9: 0
SUM OF ALL RESPONSES TO PHQ QUESTIONS 1-9: 0

## 2025-03-28 NOTE — PROGRESS NOTES
Dayron Eduardo is a 65 y.o.  male and presents with    Chief Complaint   Patient presents with    Medication Refill    Arthritis     Subjective:  Mr. Eduardo is following up after rheumatology consult; he started naproxen after being diagnosed with osteoarthritis.  He is scheduled to undergo MRI of hands.    Pain today 0/10; pain is worse when waking.    He has hypercholesterolemia and stopped atorvastatin; he started pitavastatin as ordered by dr. Morales in cardiology one month ago without side effects.      ROS   General ROS: negative for - chills or fever  ENT ROS: positive for - headaches, nasal congestion, sinus pain, and sore throat  Respiratory ROS: positive for - cough negative for - shortness of breath  Cardiovascular ROS: no chest pain or dyspnea on exertion  Musculoskeletal ROS: positive for - joint pain  Neurological ROS: positive for - headaches     All other systems reviewed and are negative.    Objective:  Vitals:    03/28/25 0902   BP: 118/72   Pulse: 57   Resp: 20   Temp: 97.7 °F (36.5 °C)   SpO2: 94%         alert, well appearing, and in no distress and oriented to person, place, and time  Mental status - normal mood, behavior, speech, dress, motor activity, and thought processes  Chest - clear to auscultation, no wheezes, rales or rhonchi, symmetric air entry  Heart - normal rate, regular rhythm, normal S1, S2, no murmurs, rubs, clicks or gallops  Neurological - cranial nerves II through XII intact    LABS   pending  TESTS      Assessment/Plan:    1. Arthritis of both hands  Continue nsaid and follow up with rheumatology    2. Hypercholesteremia  Assess efficacy of treatment  - Lipid Panel; Future  - Comprehensive Metabolic Panel; Future    3. Coronary artery disease involving native coronary artery of native heart without angina pectoris  Follow up with cardiology    4. S/P cervical spinal fusion      5. Radicular pain in right arm  Pain management with increased dose  - gabapentin

## 2025-03-28 NOTE — PROGRESS NOTES
Dayron Eduardo is a 65 y.o. year old male who presents today for   Chief Complaint   Patient presents with    3 Month Follow-Up    Medication Refill        \"Have you been to the ER, urgent care clinic since your last visit?  Hospitalized since your last visit?\"   NO     “Have you seen or consulted any other health care providers outside our system since your last visit?”   YES - When: approximately 3 days ago.  Where and Why: [facility] RHEUMATOLOGY F/U .             Lilian Zavala  [facility]  Phone: [phone]  Fax: [phone]

## 2025-04-03 ENCOUNTER — RESULTS FOLLOW-UP (OUTPATIENT)
Facility: CLINIC | Age: 65
End: 2025-04-03

## 2025-05-05 ENCOUNTER — OFFICE VISIT (OUTPATIENT)
Facility: CLINIC | Age: 65
End: 2025-05-05
Payer: COMMERCIAL

## 2025-05-05 VITALS
HEART RATE: 69 BPM | BODY MASS INDEX: 27.14 KG/M2 | HEIGHT: 72 IN | TEMPERATURE: 97.9 F | SYSTOLIC BLOOD PRESSURE: 100 MMHG | RESPIRATION RATE: 20 BRPM | OXYGEN SATURATION: 96 % | WEIGHT: 200.4 LBS | DIASTOLIC BLOOD PRESSURE: 64 MMHG

## 2025-05-05 DIAGNOSIS — I10 PRIMARY HYPERTENSION: ICD-10-CM

## 2025-05-05 DIAGNOSIS — K57.92 ACUTE DIVERTICULITIS: ICD-10-CM

## 2025-05-05 DIAGNOSIS — Z09 HOSPITAL DISCHARGE FOLLOW-UP: Primary | ICD-10-CM

## 2025-05-05 PROCEDURE — 99214 OFFICE O/P EST MOD 30 MIN: CPT | Performed by: FAMILY MEDICINE

## 2025-05-05 PROCEDURE — 3078F DIAST BP <80 MM HG: CPT | Performed by: FAMILY MEDICINE

## 2025-05-05 PROCEDURE — G8419 CALC BMI OUT NRM PARAM NOF/U: HCPCS | Performed by: FAMILY MEDICINE

## 2025-05-05 PROCEDURE — 3074F SYST BP LT 130 MM HG: CPT | Performed by: FAMILY MEDICINE

## 2025-05-05 PROCEDURE — G8427 DOCREV CUR MEDS BY ELIG CLIN: HCPCS | Performed by: FAMILY MEDICINE

## 2025-05-05 PROCEDURE — 1111F DSCHRG MED/CURRENT MED MERGE: CPT | Performed by: FAMILY MEDICINE

## 2025-05-05 PROCEDURE — 1036F TOBACCO NON-USER: CPT | Performed by: FAMILY MEDICINE

## 2025-05-05 PROCEDURE — 3017F COLORECTAL CA SCREEN DOC REV: CPT | Performed by: FAMILY MEDICINE

## 2025-05-05 RX ORDER — METRONIDAZOLE 500 MG/1
500 TABLET ORAL EVERY 8 HOURS
COMMUNITY
Start: 2025-05-01 | End: 2025-05-09

## 2025-05-05 RX ORDER — CIPROFLOXACIN 500 MG/1
500 TABLET, FILM COATED ORAL EVERY 12 HOURS
COMMUNITY
Start: 2025-05-01 | End: 2025-05-09

## 2025-05-05 RX ORDER — BACLOFEN 20 MG
300 TABLET ORAL 2 TIMES DAILY
COMMUNITY

## 2025-05-05 NOTE — PROGRESS NOTES
Dayron Eduardo is a 65 y.o.  male and presents with    Chief Complaint   Patient presents with    Follow-Up from Hospital      [facility]/ [facility] PAIN/ ACUTE DIVERTICULITIS.   4/29/2025 admitted for diverticulitis  5/1/2025 discharged to home    Subjective:  Mr. Eduardo is here for follow up after hospitalization for diverticulitis. He is taking cipro and flagyl     He stopped naprosyn when he had abdominal pain and now he does not have the joint pain he was previously treated for.     He has hypercholesterolemia and continues to take pitavastatin as ordered by dr. Morales in cardiology without side effects.       ROS   General ROS: negative for - chills or fever  ENT ROS: positive for - headaches, nasal congestion, sinus pain, and sore throat  Respiratory ROS: positive for - cough negative for - shortness of breath  Cardiovascular ROS: no chest pain or dyspnea on exertion  Musculoskeletal ROS: positive for - joint pain  Neurological ROS: positive for - headaches       All other systems reviewed and are negative.      Objective:  Vitals:    05/05/25 1148   BP: 100/64   Pulse: 69   Resp: 20   Temp: 97.9 °F (36.6 °C)   SpO2: 96%       alert, well appearing, and in no distress and oriented to person, place, and time  Mental status - normal mood, behavior, speech, dress, motor activity, and thought processes  Chest - clear to auscultation, no wheezes, rales or rhonchi, symmetric air entry  Heart - normal rate, regular rhythm, normal S1, S2, no murmurs, rubs, clicks or gallops  Abd - soft , NT/ND  Neurological - cranial nerves II through XII intact    LABS   WBC 4.2  TESTS  CT ABD/PELVIS-IV ONLY  Order: [id number]  Impression      Severe acute diverticulitis of the sigmoid colon with a small amount of adjacent extraluminal gas suggesting contained perforation. No disseminated free intraperitoneal air. No abscess or bowel obstruction.    Assessment/Plan:    1. Hospital discharge follow-up    - [place] DISCHARGE MEDS RECONCILED

## 2025-05-05 NOTE — PROGRESS NOTES
Dayron Eduardo is a 65 y.o. year old male who presents today for   Chief Complaint   Patient presents with    Follow-Up from Hospital      [facility]/ ABD PAIN/ ACUTE DIVERTICULITIS.        \"Have you been to the ER, urgent care clinic since your last visit?  Hospitalized since your last visit?\"   YES - When: approximately 6 days ago.  Where and Why: [facility]/ ABD PAIN/ ACUTE DIVERTICULITIS.     “Have you seen or consulted any other health care providers outside our system since your last visit?”   NO             [facility]  Phone: [phone]  Fax: [phone]

## 2025-06-25 RX ORDER — CARVEDILOL 6.25 MG/1
6.25 TABLET ORAL 2 TIMES DAILY WITH MEALS
Qty: 180 TABLET | Refills: 3 | Status: SHIPPED | OUTPATIENT
Start: 2025-06-25

## 2025-06-25 NOTE — TELEPHONE ENCOUNTER
Medication requested :   Requested Prescriptions     Pending Prescriptions Disp Refills    carvedilol (COREG) 6.25 MG tablet 60 tablet 6     Sig: Take 1 tablet by mouth with breakfast and with evening meal      PCP: David Carranza MD  LOV:           5/5/2025   NOV DMA: 8/1/2025  FUTURE APPT:   Future Appointments   Date Time Provider Department Center   7/31/2025  8:00 AM Irina Stanley PA-C [facility]   8/1/2025  9:00 AM David Carranza MD \[facility]\"" DEP   1/5/2026  8:00 AM David Carranza MD \[facility]\"" DEP       Thank you.

## 2025-06-27 RX ORDER — PITAVASTATIN CALCIUM 2.09 MG/1
2 TABLET, FILM COATED ORAL NIGHTLY
Qty: 30 TABLET | Refills: 5 | Status: SHIPPED | OUTPATIENT
Start: 2025-06-27

## 2025-06-27 NOTE — TELEPHONE ENCOUNTER
PCP: David Carranza MD    Last appt:  10/31/2024   Future Appointments   Date Time Provider Department Center   7/31/2025  8:00 AM Irina Stanley PA-C [facility]   8/1/2025  9:00 AM David Carranza MD [facility] DEP   1/5/2026  8:00 AM David Carranza MD [facility] DEP       Requested Prescriptions     Pending Prescriptions Disp Refills    pitavastatin (LIVALO) 2 MG TABS tablet 30 tablet 5     Sig: Take 1 tablet by mouth nightly       Request for a 30 or 90 day supply? Provider Discretion    Pharmacy: confirmed    Other Comments:n/a

## 2025-07-21 RX ORDER — ISOSORBIDE MONONITRATE 30 MG/1
30 TABLET, EXTENDED RELEASE ORAL DAILY
Qty: 90 TABLET | Refills: 3 | Status: SHIPPED | OUTPATIENT
Start: 2025-07-21

## 2025-07-21 NOTE — TELEPHONE ENCOUNTER
PCP: David Carranza MD    Last appt:  10/31/2024   Future Appointments   Date Time Provider Department Center   7/23/2025 10:30 AM David Carranza MD \[facility]\""   7/31/2025  8:00 AM Irina Stanley PA-C [facility]   8/1/2025  9:00 AM David Carranza MD \[facility]\""   1/5/2026  8:00 AM David Carranza MD \[facility]\""       Requested Prescriptions     Pending Prescriptions Disp Refills    isosorbide mononitrate (IMDUR) 30 MG extended release tablet 90 tablet 3     Sig: Take 1 tablet by mouth daily       Request for a 30 or 90 day supply? Provider Discretion    Pharmacy: confirmed    Other Comments: n/a

## 2025-07-23 ENCOUNTER — OFFICE VISIT (OUTPATIENT)
Facility: CLINIC | Age: 65
End: 2025-07-23
Payer: COMMERCIAL

## 2025-07-23 ENCOUNTER — HOSPITAL ENCOUNTER (OUTPATIENT)
Facility: HOSPITAL | Age: 65
Setting detail: SPECIMEN
Discharge: HOME | End: 2025-07-26
Payer: COMMERCIAL

## 2025-07-23 VITALS
SYSTOLIC BLOOD PRESSURE: 99 MMHG | BODY MASS INDEX: 26.41 KG/M2 | RESPIRATION RATE: 17 BRPM | HEIGHT: 72 IN | DIASTOLIC BLOOD PRESSURE: 67 MMHG | OXYGEN SATURATION: 99 % | TEMPERATURE: 97.5 F | HEART RATE: 78 BPM | WEIGHT: 195 LBS

## 2025-07-23 DIAGNOSIS — I10 PRIMARY HYPERTENSION: ICD-10-CM

## 2025-07-23 DIAGNOSIS — E78.00 HYPERCHOLESTEREMIA: ICD-10-CM

## 2025-07-23 DIAGNOSIS — R73.01 IMPAIRED FASTING GLUCOSE: ICD-10-CM

## 2025-07-23 DIAGNOSIS — D62 ACUTE BLOOD LOSS ANEMIA: ICD-10-CM

## 2025-07-23 DIAGNOSIS — R76.8 RHEUMATOID FACTOR POSITIVE: ICD-10-CM

## 2025-07-23 DIAGNOSIS — Z98.890 S/P COLON POLYPECTOMY: ICD-10-CM

## 2025-07-23 DIAGNOSIS — K92.2 LOWER GI BLEED: ICD-10-CM

## 2025-07-23 DIAGNOSIS — Z86.0100 S/P COLON POLYPECTOMY: ICD-10-CM

## 2025-07-23 DIAGNOSIS — M05.741 RHEUMATOID ARTHRITIS INVOLVING BOTH HANDS WITH POSITIVE RHEUMATOID FACTOR (HCC): ICD-10-CM

## 2025-07-23 DIAGNOSIS — M05.742 RHEUMATOID ARTHRITIS INVOLVING BOTH HANDS WITH POSITIVE RHEUMATOID FACTOR (HCC): ICD-10-CM

## 2025-07-23 DIAGNOSIS — K92.2 LOWER GI BLEED: Primary | ICD-10-CM

## 2025-07-23 LAB
BASOPHILS # BLD: 0.03 K/UL (ref 0–0.1)
BASOPHILS NFR BLD: 0.3 % (ref 0–2)
CHOLEST SERPL-MCNC: 128 MG/DL
DIFFERENTIAL METHOD BLD: ABNORMAL
EOSINOPHIL # BLD: 0.18 K/UL (ref 0–0.4)
EOSINOPHIL NFR BLD: 2.1 % (ref 0–5)
ERYTHROCYTE [DISTWIDTH] IN BLOOD BY AUTOMATED COUNT: 16.3 % (ref 11.6–14.5)
EST. AVERAGE GLUCOSE BLD GHB EST-MCNC: 105 MG/DL
HBA1C MFR BLD: 5.3 % (ref 4.2–5.6)
HCT VFR BLD AUTO: 33.5 % (ref 36–48)
HDLC SERPL-MCNC: 37 MG/DL (ref 40–60)
HDLC SERPL: 3.5 (ref 0–5)
HEMOGLOBIN, POC: 7.8 G/DL
HGB BLD-MCNC: 10.4 G/DL (ref 13–16)
IMM GRANULOCYTES # BLD AUTO: 0.02 K/UL (ref 0–0.04)
IMM GRANULOCYTES NFR BLD AUTO: 0.2 % (ref 0–0.5)
LDLC SERPL CALC-MCNC: 70 MG/DL (ref 0–100)
LYMPHOCYTES # BLD: 1.07 K/UL (ref 0.9–3.6)
LYMPHOCYTES NFR BLD: 12.4 % (ref 21–52)
MCH RBC QN AUTO: 28.1 PG (ref 24–34)
MCHC RBC AUTO-ENTMCNC: 31 G/DL (ref 31–37)
MCV RBC AUTO: 90.5 FL (ref 78–100)
MONOCYTES # BLD: 0.71 K/UL (ref 0.05–1.2)
MONOCYTES NFR BLD: 8.2 % (ref 3–10)
NEUTS SEG # BLD: 6.65 K/UL (ref 1.8–8)
NEUTS SEG NFR BLD: 76.8 % (ref 40–73)
NRBC # BLD: 0 K/UL (ref 0–0.01)
NRBC BLD-RTO: 0 PER 100 WBC
PLATELET # BLD AUTO: 343 K/UL (ref 135–420)
PMV BLD AUTO: 10 FL (ref 9.2–11.8)
RBC # BLD AUTO: 3.7 M/UL (ref 4.35–5.65)
TRIGL SERPL-MCNC: 105 MG/DL (ref 0–150)
VLDLC SERPL CALC-MCNC: 21 MG/DL
WBC # BLD AUTO: 8.7 K/UL (ref 4.6–13.2)

## 2025-07-23 PROCEDURE — 85025 COMPLETE CBC W/AUTO DIFF WBC: CPT

## 2025-07-23 PROCEDURE — 80061 LIPID PANEL: CPT

## 2025-07-23 PROCEDURE — 3017F COLORECTAL CA SCREEN DOC REV: CPT | Performed by: FAMILY MEDICINE

## 2025-07-23 PROCEDURE — 3074F SYST BP LT 130 MM HG: CPT | Performed by: FAMILY MEDICINE

## 2025-07-23 PROCEDURE — 85018 HEMOGLOBIN: CPT | Performed by: FAMILY MEDICINE

## 2025-07-23 PROCEDURE — 99215 OFFICE O/P EST HI 40 MIN: CPT | Performed by: FAMILY MEDICINE

## 2025-07-23 PROCEDURE — 36415 COLL VENOUS BLD VENIPUNCTURE: CPT

## 2025-07-23 PROCEDURE — G8427 DOCREV CUR MEDS BY ELIG CLIN: HCPCS | Performed by: FAMILY MEDICINE

## 2025-07-23 PROCEDURE — G8419 CALC BMI OUT NRM PARAM NOF/U: HCPCS | Performed by: FAMILY MEDICINE

## 2025-07-23 PROCEDURE — 83036 HEMOGLOBIN GLYCOSYLATED A1C: CPT

## 2025-07-23 PROCEDURE — 3078F DIAST BP <80 MM HG: CPT | Performed by: FAMILY MEDICINE

## 2025-07-23 PROCEDURE — 1036F TOBACCO NON-USER: CPT | Performed by: FAMILY MEDICINE

## 2025-07-23 SDOH — ECONOMIC STABILITY: FOOD INSECURITY: WITHIN THE PAST 12 MONTHS, THE FOOD YOU BOUGHT JUST DIDN'T LAST AND YOU DIDN'T HAVE MONEY TO GET MORE.: NEVER TRUE

## 2025-07-23 SDOH — ECONOMIC STABILITY: FOOD INSECURITY: WITHIN THE PAST 12 MONTHS, YOU WORRIED THAT YOUR FOOD WOULD RUN OUT BEFORE YOU GOT MONEY TO BUY MORE.: NEVER TRUE

## 2025-07-23 ASSESSMENT — PATIENT HEALTH QUESTIONNAIRE - PHQ9
1. LITTLE INTEREST OR PLEASURE IN DOING THINGS: NOT AT ALL
SUM OF ALL RESPONSES TO PHQ QUESTIONS 1-9: 0
2. FEELING DOWN, DEPRESSED OR HOPELESS: NOT AT ALL
SUM OF ALL RESPONSES TO PHQ QUESTIONS 1-9: 0

## 2025-07-23 NOTE — PROGRESS NOTES
Dayron Eduardo is a 65 y.o. year old male who presents today for   Chief Complaint   Patient presents with    Follow-Up from Hospital        \"Have you been to the ER, urgent care clinic since your last visit?  Hospitalized since your last visit?\"   YES - When: approximately 8 days ago.  Where and Why: [facility].     “Have you seen or consulted any other health care providers outside our system since your last visit?”   NO             Dana Rand [facility]  [address] [place] [address] [id number]  Ph: [phone]  Direct Fax: [phone]

## 2025-07-23 NOTE — PROGRESS NOTES
Dayron Eduardo is a 65 y.o.  male and presents with    Chief Complaint   Patient presents with    Follow-Up from Hospital     Subjective:  Mr. Eduardo is following up after GI bleed; he is s/p colonoscopy and polypectomy.  Five days after procedure he had bright red blood per rectum; he went to ER and had a unit of blood due to hgb = 7.  He is on abx and low fiber diet.  He had catheterization to stop bleeding.    He has dark blood in stool.  He is not using iron.      He has rheumatoid arthritis and has been prescribed methotrexate but has not used it in a week.  He is taking folic acid with the methotrexate.      He is followed by cardiologist for hypertension; he was not using medication for blood pressure in the hospital but has resumed medications.      ROS   General ROS: negative for - chills or fever  ENT ROS: positive for - headaches, nasal congestion, sinus pain, and sore throat  Respiratory ROS: positive for - cough negative for - shortness of breath  Cardiovascular ROS: no chest pain or dyspnea on exertion  Musculoskeletal ROS: positive for - joint pain  Neurological ROS: positive for - headaches    All other systems reviewed and are negative.      Objective:  Vitals:    07/23/25 1045   BP: 99/67   Pulse: 78   Resp: 17   Temp: 97.5 °F (36.4 °C)   SpO2: 99%     alert, well appearing, and in no distress and oriented to person, place, and time  Mental status - normal mood, behavior, speech, dress, motor activity, and thought processes  Eyes - pupils equal and reactive, extraocular eye movements intact;  no conjunctival pallor  Chest - clear to auscultation, no wheezes, rales or rhonchi, symmetric air entry  Heart - normal rate, regular rhythm, normal S1, S2, no murmurs, rubs, clicks or gallops  Neurological - cranial nerves II through XII intact    LABS   Hgb 9.8 7/19/2025  Today hgb 7.8  TESTS  EMBOLIZATION ANGIOGRAPHY  Order: [id number]  Impression      1. Successful uncomplicated mesenteric

## 2025-07-31 ENCOUNTER — OFFICE VISIT (OUTPATIENT)
Age: 65
End: 2025-07-31
Payer: COMMERCIAL

## 2025-07-31 VITALS
WEIGHT: 197 LBS | SYSTOLIC BLOOD PRESSURE: 104 MMHG | DIASTOLIC BLOOD PRESSURE: 68 MMHG | OXYGEN SATURATION: 96 % | HEART RATE: 58 BPM | HEIGHT: 72 IN | BODY MASS INDEX: 26.68 KG/M2

## 2025-07-31 DIAGNOSIS — I10 ESSENTIAL (PRIMARY) HYPERTENSION: ICD-10-CM

## 2025-07-31 DIAGNOSIS — I25.83 CORONARY ARTERY DISEASE DUE TO LIPID RICH PLAQUE: Primary | ICD-10-CM

## 2025-07-31 DIAGNOSIS — E78.2 MIXED HYPERLIPIDEMIA: ICD-10-CM

## 2025-07-31 DIAGNOSIS — I25.10 CORONARY ARTERY DISEASE DUE TO LIPID RICH PLAQUE: Primary | ICD-10-CM

## 2025-07-31 PROCEDURE — 3078F DIAST BP <80 MM HG: CPT | Performed by: PHYSICIAN ASSISTANT

## 2025-07-31 PROCEDURE — G8427 DOCREV CUR MEDS BY ELIG CLIN: HCPCS | Performed by: PHYSICIAN ASSISTANT

## 2025-07-31 PROCEDURE — 1036F TOBACCO NON-USER: CPT | Performed by: PHYSICIAN ASSISTANT

## 2025-07-31 PROCEDURE — 3017F COLORECTAL CA SCREEN DOC REV: CPT | Performed by: PHYSICIAN ASSISTANT

## 2025-07-31 PROCEDURE — 3074F SYST BP LT 130 MM HG: CPT | Performed by: PHYSICIAN ASSISTANT

## 2025-07-31 PROCEDURE — 99214 OFFICE O/P EST MOD 30 MIN: CPT | Performed by: PHYSICIAN ASSISTANT

## 2025-07-31 PROCEDURE — G8419 CALC BMI OUT NRM PARAM NOF/U: HCPCS | Performed by: PHYSICIAN ASSISTANT

## 2025-07-31 ASSESSMENT — PATIENT HEALTH QUESTIONNAIRE - PHQ9
SUM OF ALL RESPONSES TO PHQ QUESTIONS 1-9: 0
SUM OF ALL RESPONSES TO PHQ QUESTIONS 1-9: 0
2. FEELING DOWN, DEPRESSED OR HOPELESS: NOT AT ALL
SUM OF ALL RESPONSES TO PHQ QUESTIONS 1-9: 0
SUM OF ALL RESPONSES TO PHQ QUESTIONS 1-9: 0
1. LITTLE INTEREST OR PLEASURE IN DOING THINGS: NOT AT ALL

## 2025-07-31 NOTE — PROGRESS NOTES
[facility] Cardiology     Dayron Eduardo is a 65 y.o. male      Presents to the office today for follow up.   Patient reports he has been doing well from a cardiac standpoint.  Since switching his statin medication his myalgias have improved and he is currently tolerating Livalo without any side effects.    Denies CP, SOB, diaphoresis, N/V/D, weight gain, LE edema, orthopnea, PND, palpitations.     Past Medical History:   Diagnosis Date    CAD (coronary artery disease)     02/14 -  S/P RCA BMS (3.5 X 18 mm Vision)    Calculus of kidney     Herniated disc, cervical 2009    c5 and c6    HLD (hyperlipidemia)     Hypertension     Noncompliance     NSTEMI (non-ST elevated myocardial infarction) ([facility]) 02/2014       Past Surgical History:   Procedure Laterality Date    BACK SURGERY      c5/6 plate    COLONOSCOPY N/A 4/10/2019    COLONOSCOPY performed by Anupama Osorio MD at [facility] ENDOSCOPY    CORONARY ANGIOPLASTY WITH STENT PLACEMENT  2014    2 stents insterted    KNEE SURGERY  1980    right knee    UROLOGICAL SURGERY  08/18/2015    Dr. Rodríguez, [facility], Cystoscopy, Left retrograde pyelogram, Left uretroscopy with Laser Lithotripsy, Left stone extraction, Left ureteral stent placement.       Current Outpatient Medications   Medication Sig    isosorbide mononitrate (IMDUR) 30 MG extended release tablet Take 1 tablet by mouth daily    pitavastatin (LIVALO) 2 MG TABS tablet Take 1 tablet by mouth nightly    carvedilol (COREG) 6.25 MG tablet Take 1 tablet by mouth with breakfast and with evening meal    magnesium Oxide (TRUE MAGNESIUM OXIDE) 500 MG TABS Take 300 mg by mouth 2 times daily    gabapentin (NEURONTIN) 300 MG capsule Take 1 capsule by mouth 3 times daily for 180 days. Max Daily Amount: 900 mg    losartan (COZAAR) 25 MG tablet Take 1 tablet by mouth 2 times daily    aspirin 81 MG EC tablet Take 1 tablet by mouth daily    nitroGLYCERIN (NITROSTAT) 0.4 MG SL tablet Place 1 tablet under the tongue every 5 minutes as needed

## 2025-07-31 NOTE — PROGRESS NOTES
Identified pt with two pt identifiers(name and ). Reviewed record in preparation for visit and have obtained necessary documentation.    Dayron Eduardo presents today for   Chief Complaint   Patient presents with    Follow-up      Return in about 9 month       Pt c/o DIZZINESS,             Dayron Eduardo preferred language for health care discussion is english/other.    Personal Protective Equipment:   Personal Protective Equipment was used including: mask-surgical and hands-gloves. Patient was placed on no precaution(s). Patient was not masked.    Precautions:   Patient currently on None  Patient currently roomed with door closed.    Is someone accompanying this pt? no    Is the patient using any DME equipment during OV? no    Depression Screenin/31/2025     8:09 AM 2025    10:39 AM 3/28/2025     9:01 AM 2025     8:45 AM 2024     9:06 AM 2024     2:04 PM 2022     9:30 AM   PHQ-9 Questionaire   Little interest or pleasure in doing things 0 0 0 0 0 0 0   Feeling down, depressed, or hopeless 0 0 0 0 0 0 0   PHQ-9 Total Score 0 0 0 0 0 0 0        Learning Assessment:  Who is the primary learner? Patient    What is the preferred language for health care of the primary learner? ENGLISH    How does the primary learner prefer to learn new concepts? DEMONSTRATION    Answered By self    Relationship to Learner SELF        Abuse Screenin/31/2025     8:00 AM 10/26/2023     8:00 AM   AMB Abuse Screening   Do you ever feel afraid of your partner? N N   Are you in a relationship with someone who physically or mentally threatens you? N N   Is it safe for you to go home? Y Y        Fall Risk      2025     8:19 AM 10/26/2023     8:09 AM   Fall Risk   Do you feel unsteady or are you worried about falling?  no no   2 or more falls in past year? no no   Fall with injury in past year? no no         Pt currently taking Anticoagulant /Antiplatelet therapy? no    Coordination of

## 2025-09-05 RX ORDER — LOSARTAN POTASSIUM 25 MG/1
25 TABLET ORAL 2 TIMES DAILY
Qty: 180 TABLET | Refills: 2 | Status: SHIPPED | OUTPATIENT
Start: 2025-09-05